# Patient Record
Sex: FEMALE | Race: WHITE | URBAN - METROPOLITAN AREA
[De-identification: names, ages, dates, MRNs, and addresses within clinical notes are randomized per-mention and may not be internally consistent; named-entity substitution may affect disease eponyms.]

---

## 2017-12-10 ENCOUNTER — APPOINTMENT (OUTPATIENT)
Dept: GENERAL RADIOLOGY | Facility: CLINIC | Age: 82
End: 2017-12-10
Attending: EMERGENCY MEDICINE
Payer: COMMERCIAL

## 2017-12-10 ENCOUNTER — APPOINTMENT (OUTPATIENT)
Dept: CT IMAGING | Facility: CLINIC | Age: 82
End: 2017-12-10
Attending: EMERGENCY MEDICINE
Payer: COMMERCIAL

## 2017-12-10 ENCOUNTER — HOSPITAL ENCOUNTER (EMERGENCY)
Facility: CLINIC | Age: 82
Discharge: HOME | End: 2017-12-10
Attending: EMERGENCY MEDICINE | Admitting: EMERGENCY MEDICINE
Payer: COMMERCIAL

## 2017-12-10 VITALS
OXYGEN SATURATION: 92 % | HEART RATE: 65 BPM | BODY MASS INDEX: 28.93 KG/M2 | RESPIRATION RATE: 20 BRPM | TEMPERATURE: 97.6 F | DIASTOLIC BLOOD PRESSURE: 82 MMHG | WEIGHT: 180 LBS | SYSTOLIC BLOOD PRESSURE: 152 MMHG | HEIGHT: 66 IN

## 2017-12-10 DIAGNOSIS — K59.00 CONSTIPATION, UNSPECIFIED CONSTIPATION TYPE: ICD-10-CM

## 2017-12-10 DIAGNOSIS — R55 SYNCOPE, UNSPECIFIED SYNCOPE TYPE: ICD-10-CM

## 2017-12-10 LAB
ALBUMIN UR-MCNC: NEGATIVE MG/DL
ANION GAP SERPL CALCULATED.3IONS-SCNC: 7 MMOL/L (ref 3–14)
APPEARANCE UR: CLEAR
BASOPHILS # BLD AUTO: 0 10E9/L (ref 0–0.2)
BASOPHILS NFR BLD AUTO: 0.2 %
BILIRUB UR QL STRIP: NEGATIVE
BUN SERPL-MCNC: 30 MG/DL (ref 7–30)
CALCIUM SERPL-MCNC: 8.6 MG/DL (ref 8.5–10.1)
CHLORIDE SERPL-SCNC: 102 MMOL/L (ref 94–109)
CO2 SERPL-SCNC: 32 MMOL/L (ref 20–32)
COLOR UR AUTO: YELLOW
CREAT SERPL-MCNC: 1.09 MG/DL (ref 0.52–1.04)
DIFFERENTIAL METHOD BLD: NORMAL
EOSINOPHIL # BLD AUTO: 0.1 10E9/L (ref 0–0.7)
EOSINOPHIL NFR BLD AUTO: 2.9 %
ERYTHROCYTE [DISTWIDTH] IN BLOOD BY AUTOMATED COUNT: 12.5 % (ref 10–15)
GFR SERPL CREATININE-BSD FRML MDRD: 47 ML/MIN/1.7M2
GLUCOSE SERPL-MCNC: 115 MG/DL (ref 70–99)
GLUCOSE UR STRIP-MCNC: NEGATIVE MG/DL
HCT VFR BLD AUTO: 37.9 % (ref 35–47)
HGB BLD-MCNC: 12.8 G/DL (ref 11.7–15.7)
HGB UR QL STRIP: NEGATIVE
HYALINE CASTS #/AREA URNS LPF: 4 /LPF (ref 0–2)
IMM GRANULOCYTES # BLD: 0 10E9/L (ref 0–0.4)
IMM GRANULOCYTES NFR BLD: 0.2 %
INTERPRETATION ECG - MUSE: NORMAL
KETONES UR STRIP-MCNC: NEGATIVE MG/DL
LEUKOCYTE ESTERASE UR QL STRIP: ABNORMAL
LYMPHOCYTES # BLD AUTO: 0.8 10E9/L (ref 0.8–5.3)
LYMPHOCYTES NFR BLD AUTO: 17.6 %
MCH RBC QN AUTO: 32.9 PG (ref 26.5–33)
MCHC RBC AUTO-ENTMCNC: 33.8 G/DL (ref 31.5–36.5)
MCV RBC AUTO: 97 FL (ref 78–100)
MONOCYTES # BLD AUTO: 0.5 10E9/L (ref 0–1.3)
MONOCYTES NFR BLD AUTO: 10.8 %
NEUTROPHILS # BLD AUTO: 3.1 10E9/L (ref 1.6–8.3)
NEUTROPHILS NFR BLD AUTO: 68.3 %
NITRATE UR QL: NEGATIVE
NRBC # BLD AUTO: 0 10*3/UL
NRBC BLD AUTO-RTO: 0 /100
PH UR STRIP: 5 PH (ref 5–7)
PLATELET # BLD AUTO: 217 10E9/L (ref 150–450)
POTASSIUM SERPL-SCNC: 3.5 MMOL/L (ref 3.4–5.3)
RBC # BLD AUTO: 3.89 10E12/L (ref 3.8–5.2)
RBC #/AREA URNS AUTO: 1 /HPF (ref 0–2)
SODIUM SERPL-SCNC: 141 MMOL/L (ref 133–144)
SOURCE: ABNORMAL
SP GR UR STRIP: 1.01 (ref 1–1.03)
SQUAMOUS #/AREA URNS AUTO: <1 /HPF (ref 0–1)
TROPONIN I SERPL-MCNC: <0.015 UG/L (ref 0–0.04)
UROBILINOGEN UR STRIP-MCNC: NORMAL MG/DL (ref 0–2)
WBC # BLD AUTO: 4.6 10E9/L (ref 4–11)
WBC #/AREA URNS AUTO: 2 /HPF (ref 0–2)

## 2017-12-10 PROCEDURE — 84484 ASSAY OF TROPONIN QUANT: CPT | Performed by: EMERGENCY MEDICINE

## 2017-12-10 PROCEDURE — 71020 XR CHEST 2 VW: CPT

## 2017-12-10 PROCEDURE — 85025 COMPLETE CBC W/AUTO DIFF WBC: CPT | Performed by: EMERGENCY MEDICINE

## 2017-12-10 PROCEDURE — 93005 ELECTROCARDIOGRAM TRACING: CPT

## 2017-12-10 PROCEDURE — 80048 BASIC METABOLIC PNL TOTAL CA: CPT | Performed by: EMERGENCY MEDICINE

## 2017-12-10 PROCEDURE — 81001 URINALYSIS AUTO W/SCOPE: CPT | Performed by: EMERGENCY MEDICINE

## 2017-12-10 PROCEDURE — 25000132 ZZH RX MED GY IP 250 OP 250 PS 637: Performed by: EMERGENCY MEDICINE

## 2017-12-10 PROCEDURE — 99285 EMERGENCY DEPT VISIT HI MDM: CPT | Mod: 25

## 2017-12-10 PROCEDURE — 70450 CT HEAD/BRAIN W/O DYE: CPT

## 2017-12-10 RX ADMIN — SODIUM PHOSPHATE, DIBASIC AND SODIUM PHOSPHATE, MONOBASIC 1 ENEMA: 7; 19 ENEMA RECTAL at 13:01

## 2017-12-10 ASSESSMENT — ENCOUNTER SYMPTOMS
CHILLS: 0
SHORTNESS OF BREATH: 0
NAUSEA: 1
VOMITING: 1
FEVER: 0
CONSTIPATION: 1

## 2017-12-10 NOTE — ED PROVIDER NOTES
"  History     Chief Complaint:  Loss of Consciousness (pt was feeling nauseated and ill since about 0930, went into bathroom and had syncopal episode while sitting on toilet, may have hit her head, continues with nausea)      LU Barrera is a 89 year old female who presents with loss of consciousness. The patient was at [employer] when she suddenly felt weak and warm. She went to the bathroom, sat on the toilet and had a syncopal event. The patient ate breakfast and was feeling fine prior to going to [employer]. She doesn't have a fever, the chills, chest pain, shortness of breath, or a history of syncope. The patient did vomit. She feels fine now in the ED.    Allergies:  NKDA     Medications:    The patient is currently on no regular medications.      Past Medical History:    GERD  Hiatal Hernia  Hypertension    Past Surgical History:    The patient does not have any pertinent past surgical history.    Family History:    No past pertinent family history.    Social History:  The patient denies tobacco or alcohol use.  Marital Status:       Review of Systems   Constitutional: Negative for chills and fever.   Respiratory: Negative for shortness of breath.    Cardiovascular: Negative for chest pain.   Gastrointestinal: Positive for constipation, nausea and vomiting.   Neurological: Positive for syncope.   All other systems reviewed and are negative.        Physical Exam   First Vitals:  BP: 152/82  Pulse: 65  Temp: 97.6  F (36.4  C)  Resp: 20  Height: 167.6 cm (5' 6\")  Weight: 81.6 kg (180 lb)  SpO2: 92 %      Physical Exam  Constitutional:  Elderly white female, supine  HENT:  The patient has no signs of trauma.  EYES:   BERTHA.  EOMI  NECK:  Positive JVD  CARDIOVASCULAR:  regular rhythm.   No murmur present.  No rub, no gallop present.  PUL/CHEST WALL:  Bilateral breath sounds normal.  The patient has no wheezes, rales, or rhonchi.  ABDOMINAL:  Midline suprapubic incision. 1+ femoral pulse bilaterally.  soft.  No " tenderness. no rebound or guarding.  MUSCULOSKELETAL:  No edema present.  No tenderness  NEUROLOGIC:  The patient is alert and oriented x3.  normal coordination and strength. Fluent speech. No facial asymmetry. Normal sensation.  SKIN:  The patient's skin is warm and dry.  No erythema or rash present.  HEME/LYMPH:  No lymphadenopathy      Emergency Department Course   ECG:  Indication: Syncope  Time: 1120  Vent. Rate 67 bpm. LA interval 194. QRS duration 120. QT/QTc 410/433. P-R-T axis 48 -47 58. Normal sinus rhythm. Left anterior fascicular block. Left ventricular hypertrophy with QRS widening.   Read time: 1125      Imaging:  Radiographic findings were communicated with the patient who voiced understanding of the findings.  CT Head without contrast:   1. No acute abnormality.  2.  Atrophy of the brain.  White matter changes consistent with  sequelae of small vessel ischemic disease.   As per radiology.    XR Chest 2 views:   Large air-filled hiatal hernia behind the heart. Marked  cardiomegaly. Calcified granuloma mid lateral left lung. No  infiltrates or pleural effusions. Ankylosis of the midthoracic spine. As per radiology.     Laboratory:  1100 Troponin <0.015    CBC: WBC: 4.6, HGB: 12.8, PLT: 217    BMP: Glucose 115, Creatinine 1.09, GFR Estimate 47, o/w WNL     UA reflex micro: Hyaline Casts 4, Leukocyte Esterase Urine - Small, o/w negative    Interventions:  1301 Sodium phosphate 1 enema      Emergency Department Course:  Nursing notes and vitals reviewed.     1110 I performed an exam of the patient as documented above.     Blood drawn. This was sent to the lab for further testing, results above.    EKG obtained in the ED, see results above.     The patient provided a urine sample here in the emergency department. This was sent for laboratory testing, findings above.     The patient was sent for a head CT and a chest XR while in the emergency department, findings above.     1231 I rechecked the patient and  discussed the results of her workup thus far.     Findings and plan explained to the Patient. Patient discharged home with instructions regarding supportive care, medications, and reasons to return. The importance of close follow-up was reviewed.     I personally reviewed the laboratory results with the Patient and answered all related questions prior to discharge.         Impression & Plan      Medical Decision Making:  Naveen Barrera is a 89 year old female who presents to the emergency department with her daughter by ambulance from [facility]. She is visiting from [facility]. Her daughter is having chemotherapy and the patient comes down and helps with this. In [facility] today she was feeling warm and somewhat flushed. She went to the bathroom and then was found a few minutes later on the floor. She did bump her head, but she doesn't feel any head pain or discomfort. Additionally, she has no neck, chest, or abdominal pain, although she feels a little cramping in her abdomen as if she has to have a bowel movement. She did not have any black or bloody stool. She has no focal numbness or weakness. On examination, the abdomen is soft and there is no guarding or rebound. Her rectal exam shows no stool and no blood. Her workup, including CT scan, chest XR, an EKG, other labs, and her urine were un-remarketable. The patient was observed several hours on monitor without any problems. We did give her a fleet enema, which she did have a small stool with this, and it seemed to help. I discussed patient with the patient and her daughter. They feel that they are ready to be discharged home. Her doctor is in [place], and she should follow-up when she returns there. However, in the meantime if she is in increasing pain, weakness, persistent vomiting, or confusion, she should return to the ED.       Diagnosis:    ICD-10-CM   1. Syncope, unspecified syncope type R55   2. Constipation, unspecified constipation type K59.00   3 Head  Trauma        Disposition:  discharged to home    Discharge Medications:      I, Quinn Solares, am serving as a scribe on 12/10/2017 at 11:10 AM to personally document services performed by Gen Cuevas MD based on my observations and the provider's statements to me.       Quinn Solares  12/10/2017    EMERGENCY DEPARTMENT       Gen Cuevas MD  12/12/17 0711

## 2017-12-10 NOTE — ED AVS SNAPSHOT
Emergency Department    [address] [facility] [address]    Phone:  [phone]    Fax:  [phone]                                       Naveen Barrera   MRN: [id number]    Department:   Emergency Department   Date of Visit:  12/10/2017           Patient Information     Date Of Birth          6/12/1926        Your diagnoses for this visit were:     Syncope, unspecified syncope type     Constipation, unspecified constipation type        You were seen by Gen Cuevas MD.      Follow-up Information     Schedule an appointment as soon as possible for a visit with own md.    Why:  recheck ed, If symptoms worsen or reoccur      Discharge References/Attachments     SYNCOPE, UNK CAUSE (ENGLISH)      24 Hour Appointment Hotline       To make an appointment at any [facility], call [phone] ([phone]). If you don't have a family doctor or clinic, we will help you find one. [facility] clinics are conveniently located to serve the needs of you and your family.             Review of your medicines      Notice     You have not been prescribed any medications.            Procedures and tests performed during your visit     Basic metabolic panel    CBC with platelets differential    Chest XR,  PA & LAT    EKG 12-lead, tracing only    Head CT w/o contrast    Troponin I    UA reflex to Microscopic      Orders Needing Specimen Collection     None      Pending Results     No orders found from 12/8/2017 to 12/11/2017.            Pending Culture Results     No orders found from 12/8/2017 to 12/11/2017.            Pending Results Instructions     If you had any lab results that were not finalized at the time of your Discharge, you can call the ED Lab Result RN at [phone]. You will be contacted by this team for any positive Lab results or changes in treatment. The nurses are available 7 days a week from 10A to 6:30P.  You can leave a message 24 hours per day and they will return your  call.        Test Results From Your Hospital Stay        12/10/2017 11:16 AM      Component Results     Component Value Ref Range & Units Status    WBC 4.6 4.0 - 11.0 10e9/L Final    RBC Count 3.89 3.8 - 5.2 10e12/L Final    Hemoglobin 12.8 11.7 - 15.7 g/dL Final    Hematocrit 37.9 35.0 - 47.0 % Final    MCV 97 78 - 100 fl Final    MCH 32.9 26.5 - 33.0 pg Final    MCHC 33.8 31.5 - 36.5 g/dL Final    RDW 12.5 10.0 - 15.0 % Final    Platelet Count 217 150 - 450 10e9/L Final    Diff Method Automated Method  Final    % Neutrophils 68.3 % Final    % Lymphocytes 17.6 % Final    % Monocytes 10.8 % Final    % Eosinophils 2.9 % Final    % Basophils 0.2 % Final    % Immature Granulocytes 0.2 % Final    Nucleated RBCs 0 0 /100 Final    Absolute Neutrophil 3.1 1.6 - 8.3 10e9/L Final    Absolute Lymphocytes 0.8 0.8 - 5.3 10e9/L Final    Absolute Monocytes 0.5 0.0 - 1.3 10e9/L Final    Absolute Eosinophils 0.1 0.0 - 0.7 10e9/L Final    Absolute Basophils 0.0 0.0 - 0.2 10e9/L Final    Abs Immature Granulocytes 0.0 0 - 0.4 10e9/L Final    Absolute Nucleated RBC 0.0  Final         12/10/2017 11:30 AM      Component Results     Component Value Ref Range & Units Status    Sodium 141 133 - 144 mmol/L Final    Potassium 3.5 3.4 - 5.3 mmol/L Final    Chloride 102 94 - 109 mmol/L Final    Carbon Dioxide 32 20 - 32 mmol/L Final    Anion Gap 7 3 - 14 mmol/L Final    Glucose 115 (H) 70 - 99 mg/dL Final    Urea Nitrogen 30 7 - 30 mg/dL Final    Creatinine 1.09 (H) 0.52 - 1.04 mg/dL Final    GFR Estimate 47 (L) >60 mL/min/1.7m2 Final    Non  GFR Calc    GFR Estimate If Black 57 (L) >60 mL/min/1.7m2 Final    African American GFR Calc    Calcium 8.6 8.5 - 10.1 mg/dL Final         12/10/2017 11:39 AM      Component Results     Component Value Ref Range & Units Status    Troponin I ES <0.015 0.000 - 0.045 ug/L Final    The 99th percentile for upper reference range is 0.045 ug/L.  Troponin values   in the range of 0.045 - 0.120 ug/L  may be associated with risks of adverse   clinical events.           12/10/2017 11:55 AM      Narrative     CHEST TWO VIEWS  12/10/2017 11:45 AM     HISTORY: Syncope.     COMPARISON: None.        Impression     IMPRESSION: Large air-filled hiatal hernia behind the heart. Marked  cardiomegaly. Calcified granuloma mid lateral left lung. No  infiltrates or pleural effusions. Ankylosis of the midthoracic spine.    SHAYNA DANIEL MD         12/10/2017 11:55 AM      Narrative     CT SCAN OF THE HEAD WITHOUT CONTRAST   12/10/2017 11:49 AM     HISTORY: Fell and hit head.    TECHNIQUE:  Axial images of the head and coronal reformations without  IV contrast material. Radiation dose for this scan was reduced using  automated exposure control, adjustment of the mA and/or kV according  to patient size, or iterative reconstruction technique.    COMPARISON: None.    FINDINGS:  There is generalized atrophy of the brain.  There is low  attenuation in the white matter of the cerebral hemispheres consistent  with sequelae of small vessel ischemic disease. There is no evidence  of intracranial hemorrhage, mass, acute infarct or anomaly.     The visualized portions of the sinuses and mastoids appear normal.  There is no evidence of trauma.        Impression     IMPRESSION:   1. No acute abnormality.  2.  Atrophy of the brain.  White matter changes consistent with  sequelae of small vessel ischemic disease.      SHAYNA DANIEL MD         12/10/2017 11:45 AM      Component Results     Component Value Ref Range & Units Status    Color Urine Yellow  Final    Appearance Urine Clear  Final    Glucose Urine Negative NEG^Negative mg/dL Final    Bilirubin Urine Negative NEG^Negative Final    Ketones Urine Negative NEG^Negative mg/dL Final    Specific Gravity Urine 1.013 1.003 - 1.035 Final    Blood Urine Negative NEG^Negative Final    pH Urine 5.0 5.0 - 7.0 pH Final    Protein Albumin Urine Negative NEG^Negative mg/dL Final    Urobilinogen mg/dL  Normal 0.0 - 2.0 mg/dL Final    Nitrite Urine Negative NEG^Negative Final    Leukocyte Esterase Urine Small (A) NEG^Negative Final    Source Midstream Urine  Final    RBC Urine 1 0 - 2 /HPF Final    WBC Urine 2 0 - 2 /HPF Final    Squamous Epithelial /HPF Urine <1 0 - 1 /HPF Final    Hyaline Casts 4 (H) 0 - 2 /LPF Final                Clinical Quality Measure: Blood Pressure Screening     Your blood pressure was checked while you were in the emergency department today. The last reading we obtained was  BP: 152/82 . Please read the guidelines below about what these numbers mean and what you should do about them.  If your systolic blood pressure (the top number) is less than 120 and your diastolic blood pressure (the bottom number) is less than 80, then your blood pressure is normal. There is nothing more that you need to do about it.  If your systolic blood pressure (the top number) is 120-139 or your diastolic blood pressure (the bottom number) is 80-89, your blood pressure may be higher than it should be. You should have your blood pressure rechecked within a year by a primary care provider.  If your systolic blood pressure (the top number) is 140 or greater or your diastolic blood pressure (the bottom number) is 90 or greater, you may have high blood pressure. High blood pressure is treatable, but if left untreated over time it can put you at risk for heart attack, stroke, or kidney failure. You should have your blood pressure rechecked by a primary care provider within the next 4 weeks.  If your provider in the emergency department today gave you specific instructions to follow-up with your doctor or provider even sooner than that, you should follow that instruction and not wait for up to 4 weeks for your follow-up visit.        Thank you for choosing [facility]       Thank you for choosing [facility] for your care. Our goal is always to provide you with excellent care. Hearing back from our patients is one way we  "can continue to improve our services. Please take a few minutes to complete the written survey that you may receive in the mail after you visit with us. Thank you!        [employer] Information     [employer] lets you send messages to your doctor, view your test results, renew your prescriptions, schedule appointments and more. To sign up, go to [email or web address] . Click on \"Log in\" on the left side of the screen, which will take you to the Welcome page. Then click on \"Sign up Now\" on the right side of the page.     You will be asked to enter the access code listed below, as well as some personal information. Please follow the directions to create your username and password.     Your access code is: [email or web address]  Expires: 3/10/2018  1:39 PM     Your access code will  in 90 days. If you need help or a new code, please call your [facility] clinic or [phone].        Care EveryWhere ID     This is your Care EveryWhere ID. This could be used by other organizations to access your [facility] medical records  [id number]        Equal Access to Services     [facility] : Hadzaid Howard, zenaida sutherland, lianet francis, jeb maguire. So [facility] [phone].    ATENCIÓN: Si habla español, tiene a thakur disposición servicios gratuitos de asistencia lingüística. Ketan al [phone].    We comply with applicable federal civil rights laws and [place] laws. We do not discriminate on the basis of race, color, national origin, age, disability, sex, sexual orientation, or gender identity.            After Visit Summary       This is your record. Keep this with you and show to your community pharmacist(s) and doctor(s) at your next visit.                  "

## 2017-12-10 NOTE — ED AVS SNAPSHOT
Emergency Department    [address] [place] [address]    Phone:  [phone]    Fax:  [phone]                                       Naveen Barrera   MRN: [id number]    Department:   Emergency Department   Date of Visit:  12/10/2017           After Visit Summary Signature Page     I have received my discharge instructions, and my questions have been answered. I have discussed any challenges I see with this plan with the nurse or doctor.    ..........................................................................................................................................  Patient/Patient Representative Signature      ..........................................................................................................................................  Patient Representative Print Name and Relationship to Patient    ..................................................               ................................................  Date                                            Time    ..........................................................................................................................................  Reviewed by Signature/Title    ...................................................              ..............................................  Date                                                            Time

## 2017-12-10 NOTE — ED NOTES
Bed: ED09  Expected date: 12/10/17  Expected time: 10:39 AM  Means of arrival: Ambulance  Comments:  442 89F syncope n/v